# Patient Record
Sex: MALE | Race: WHITE | NOT HISPANIC OR LATINO
[De-identification: names, ages, dates, MRNs, and addresses within clinical notes are randomized per-mention and may not be internally consistent; named-entity substitution may affect disease eponyms.]

---

## 2017-10-03 ENCOUNTER — APPOINTMENT (OUTPATIENT)
Dept: POPULATION HEALTH | Facility: CLINIC | Age: 37
End: 2017-10-03
Payer: COMMERCIAL

## 2017-10-03 PROCEDURE — G0008: CPT

## 2017-10-03 PROCEDURE — 90686 IIV4 VACC NO PRSV 0.5 ML IM: CPT

## 2022-08-11 ENCOUNTER — APPOINTMENT (OUTPATIENT)
Dept: ORTHOPEDIC SURGERY | Facility: CLINIC | Age: 42
End: 2022-08-11

## 2022-08-11 VITALS — WEIGHT: 250 LBS | BODY MASS INDEX: 33.86 KG/M2 | HEIGHT: 72 IN

## 2022-08-11 DIAGNOSIS — I10 ESSENTIAL (PRIMARY) HYPERTENSION: ICD-10-CM

## 2022-08-11 DIAGNOSIS — M25.561 PAIN IN RIGHT KNEE: ICD-10-CM

## 2022-08-11 DIAGNOSIS — M23.91 UNSPECIFIED INTERNAL DERANGEMENT OF RIGHT KNEE: ICD-10-CM

## 2022-08-11 DIAGNOSIS — E78.00 PURE HYPERCHOLESTEROLEMIA, UNSPECIFIED: ICD-10-CM

## 2022-08-11 DIAGNOSIS — Z78.9 OTHER SPECIFIED HEALTH STATUS: ICD-10-CM

## 2022-08-11 PROCEDURE — 73564 X-RAY EXAM KNEE 4 OR MORE: CPT | Mod: RT

## 2022-08-11 PROCEDURE — 99214 OFFICE O/P EST MOD 30 MIN: CPT

## 2022-08-11 NOTE — HISTORY OF PRESENT ILLNESS
[4] : 4 [2] : 2 [Dull/Aching] : dull/aching [Localized] : localized [Stabbing] : stabbing [Full time] : Work status: full time [] : Post Surgical Visit: no [FreeTextEntry1] : R Knee [FreeTextEntry3] : 7/21/22 approx  [FreeTextEntry5] : 32 y/o M eval R knee NKI onset of pain 3 wks ago approx No prior TX  [de-identified] : None [de-identified] :

## 2022-08-11 NOTE — ASSESSMENT
[FreeTextEntry1] : PRIOR MMT AND SCOPE WITH ME WITH NO MEDIAL OA NOW WITH NEW ONSET MEDIAL JOINT PAIN\par SOME NARROWING NOTED ON XRAYS BUT HE TELLS ME THIS FEELS VERY SIMILAR WITH THE INTERMITTENT MECHANICAL SYMPTOMS\par ADVISED MRI EVAL RE-TEAR MEDIAL MENISCUS VS OA

## 2022-08-11 NOTE — IMAGING
[de-identified] : Mild effusion, no warmth, no ecchymosis\par Medial joint line tenderness to palpation\par Range of motion 0-130\par 5/5 quadriceps and hamstring strength\par Positive [facility]\par No varus or valgus instability, negative lachman\par Motor and sensory intact distally\par Mildly antalgic gait\par  [Right] : right knee [All Views] : anteroposterior, lateral, skyline, and anteroposterior standing [Mild tricompartmental OA medial narrowing] : Mild tricompartmental OA medial narrowing

## 2022-08-17 ENCOUNTER — FORM ENCOUNTER (OUTPATIENT)
Age: 42
End: 2022-08-17

## 2022-08-18 ENCOUNTER — APPOINTMENT (OUTPATIENT)
Dept: MRI IMAGING | Facility: CLINIC | Age: 42
End: 2022-08-18

## 2022-08-18 PROCEDURE — 73721 MRI JNT OF LWR EXTRE W/O DYE: CPT | Mod: RT

## 2022-09-02 ENCOUNTER — APPOINTMENT (OUTPATIENT)
Dept: ORTHOPEDIC SURGERY | Facility: CLINIC | Age: 42
End: 2022-09-02

## 2022-09-02 VITALS — WEIGHT: 250 LBS | BODY MASS INDEX: 33.86 KG/M2 | HEIGHT: 72 IN

## 2022-09-02 DIAGNOSIS — Z98.890 OTHER SPECIFIED POSTPROCEDURAL STATES: ICD-10-CM

## 2022-09-02 DIAGNOSIS — M17.11 UNILATERAL PRIMARY OSTEOARTHRITIS, RIGHT KNEE: ICD-10-CM

## 2022-09-02 PROCEDURE — 99214 OFFICE O/P EST MOD 30 MIN: CPT

## 2022-09-02 RX ORDER — DICLOFENAC SODIUM 75 MG/1
75 TABLET, DELAYED RELEASE ORAL TWICE DAILY
Qty: 60 | Refills: 1 | Status: COMPLETED | COMMUNITY
Start: 2022-09-02 | End: 2022-11-01

## 2022-09-02 NOTE — ASSESSMENT
[FreeTextEntry1] : REVIEWED MRI, NO RE-TEAR\par PT, NSIADS, WEIGHT LOSS\par DISCUSSED CSI/VISCO IF WORSENS

## 2022-09-02 NOTE — IMAGING
[de-identified] : Varus deformity\par No effusion, no warmth, no ecchymosis\par Medial joint line tenderness to palpation \par Range of motion 5-140 with associated crepitus\par 5/5 quadriceps and hamstring strength\par Ligamentously stable\par Motor and sensory intact distally\par Mildly antalgic gait\par Negative Ramos\par

## 2023-12-23 ENCOUNTER — NON-APPOINTMENT (OUTPATIENT)
Age: 43
End: 2023-12-23

## 2024-08-01 ENCOUNTER — APPOINTMENT (OUTPATIENT)
Dept: ORTHOPEDIC SURGERY | Facility: CLINIC | Age: 44
End: 2024-08-01
Payer: COMMERCIAL

## 2024-08-01 VITALS — HEIGHT: 72 IN | BODY MASS INDEX: 33.86 KG/M2 | WEIGHT: 250 LBS

## 2024-08-01 DIAGNOSIS — S63.093A: ICD-10-CM

## 2024-08-01 DIAGNOSIS — M19.031 PRIMARY OSTEOARTHRITIS, RIGHT WRIST: ICD-10-CM

## 2024-08-01 DIAGNOSIS — M25.531 PAIN IN RIGHT WRIST: ICD-10-CM

## 2024-08-01 PROCEDURE — 73110 X-RAY EXAM OF WRIST: CPT | Mod: RT

## 2024-08-01 PROCEDURE — 99214 OFFICE O/P EST MOD 30 MIN: CPT | Mod: 25

## 2024-08-01 PROCEDURE — 99204 OFFICE O/P NEW MOD 45 MIN: CPT | Mod: 25

## 2024-08-01 PROCEDURE — 29125 APPL SHORT ARM SPLINT STATIC: CPT | Mod: RT

## 2024-08-01 RX ORDER — FEXOFENADINE HCL 60 MG
TABLET ORAL
Refills: 0 | Status: ACTIVE | COMMUNITY

## 2024-08-01 RX ORDER — NEBIVOLOL HYDROCHLORIDE 20 MG/1
TABLET ORAL
Refills: 0 | Status: ACTIVE | COMMUNITY

## 2024-08-01 RX ORDER — NAPROXEN SODIUM 550 MG/1
550 TABLET ORAL
Qty: 60 | Refills: 2 | Status: ACTIVE | COMMUNITY
Start: 2024-08-01 | End: 1900-01-01

## 2024-08-01 NOTE — HISTORY OF PRESENT ILLNESS
[7] : 7 [6] : 6 [Dull/Aching] : dull/aching [Sharp] : sharp [Intermittent] : intermittent [Leisure] : leisure [de-identified] : 8/1/24:" was playing basketball yesterday and felt a snap/pop in his right wrist. ulnar sided and then rad radially across the wrist RHD,  [] : no

## 2024-08-01 NOTE — ASSESSMENT
[FreeTextEntry1] : The patient was advised of the diagnosis. The natural history of the pathology was explained in full to the patient in layman's terms. All questions were answered. The risks and benefits of surgical and non-surgical treatment alternatives were explained in full to the patient.  naproxen rx NSAIDs recommended.  Patient warned of risk of NSAID medication to stomach and GI tract, risk of increase blood pressure, cardiac risk, and risk of fluid retention.  The patient should clear taking medication with internist/PMD if any problem with heart, blood pressure, or GI system exists.  splint- to be worn 24/7, except may remove for hygeine A splint was applied.  The importance of ice and elevation were discussed with the patient.  The risks were also discussed such as compartment syndrome and skin breakdown.  They were instructed to never put foreign objects down the splint.  Patients should call for increasing pain, worsening swelling, numbness, extremity discoloration, or any other concerns.

## 2024-08-01 NOTE — IMAGING
[de-identified] : right wrist-  skin intact  swelling and TTP ulnarly over ECU pain with rotation. FAROM fingers NVID  right wrist - 3 views taken today 8/1/24- no fractures, djd

## 2024-08-15 ENCOUNTER — APPOINTMENT (OUTPATIENT)
Dept: ORTHOPEDIC SURGERY | Facility: CLINIC | Age: 44
End: 2024-08-15
Payer: COMMERCIAL

## 2024-08-15 DIAGNOSIS — M25.531 PAIN IN RIGHT WRIST: ICD-10-CM

## 2024-08-15 DIAGNOSIS — S69.81XA OTHER SPECIFIED INJURIES OF RIGHT WRIST, HAND AND FINGER(S), INITIAL ENCOUNTER: ICD-10-CM

## 2024-08-15 PROCEDURE — 29260 STRAPPING OF ELBOW OR WRIST: CPT | Mod: RT

## 2024-08-15 PROCEDURE — 99213 OFFICE O/P EST LOW 20 MIN: CPT | Mod: 25

## 2024-08-19 PROBLEM — S69.81XA TFCC (TRIANGULAR FIBROCARTILAGE COMPLEX) INJURY, RIGHT, INITIAL ENCOUNTER: Status: ACTIVE | Noted: 2024-08-19

## 2024-08-19 NOTE — IMAGING
[de-identified] : right wrist-  skin intact  swelling and TTP ulnarly over ECU pain with rotation. FAROM fingers NVID

## 2024-08-19 NOTE — ASSESSMENT
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 We discussed that most of the TFCC is avascular and tears are slow to heal if at all but that surgical results are not guaranteed due to the poor healing capacity of the structure.  Even at surgery, most tears cannot be repaired, but are merely debrided.  We discussed the use of nsaids, bracing, rest, local modalities, injection and surgery in the treatment of TFCC tears. At this point, the patient will proceed with non-operative treatment.   NSAIDs recommended.  Patient warned of risk of NSAID medication to stomach and GI tract, risk of increase blood pressure, cardiac risk, and risk of fluid retention.  The patient should clear taking medication with internist/PMD if any problem with heart, blood pressure, or GI system exists.  The affected wrist is noted to be clean and dry.  A wrist strap/wrist widget was applied to the affected wrist over the TFCC. Fit and pressure were assessed as the strapping was applied and stopped when the desired amount of support was achieved  RTO 3 weeks

## 2024-08-19 NOTE — IMAGING
[de-identified] : right wrist-  skin intact  swelling and TTP ulnarly over ECU pain with rotation. FAROM fingers NVID

## 2024-08-19 NOTE — HISTORY OF PRESENT ILLNESS
[7] : 7 [6] : 6 [Dull/Aching] : dull/aching [Sharp] : sharp [Intermittent] : intermittent [Leisure] : leisure [de-identified] : 8/15/24: pain improved about 70%, but still persistent in right wrist   8/1/24:" was playing basketball yesterday and felt a snap/pop in his right wrist. ulnar sided and then rad radially across the wrist RHD,  [] : no

## 2024-08-19 NOTE — HISTORY OF PRESENT ILLNESS
[7] : 7 [6] : 6 [Dull/Aching] : dull/aching [Sharp] : sharp [Intermittent] : intermittent [Leisure] : leisure [de-identified] : 8/15/24: pain improved about 70%, but still persistent in right wrist   8/1/24:" was playing basketball yesterday and felt a snap/pop in his right wrist. ulnar sided and then rad radially across the wrist RHD,  [] : no